# Patient Record
Sex: MALE | Race: WHITE
[De-identification: names, ages, dates, MRNs, and addresses within clinical notes are randomized per-mention and may not be internally consistent; named-entity substitution may affect disease eponyms.]

---

## 2017-07-19 NOTE — CODING QUERY MEDICAL NECESSITY
SUPPORTING DIAGNOSIS NEEDED





A supporting diagnosis is required for the test/procedure performed on this patient in 
order for us to be reimbursed by the patient's insurance. Please provide a supporting 
diagnosis for the following test/procedure listed below next to the test name along with 
your signature. 



*If there is no additional diagnosis for this patient that would support the following 
test/procedure please document that below next to the test/procedure.



Test(s)/Procedure(s) that require a supporting diagnosis:







* VITAMIN D, 25-HYDROXY                          DIAGNOSIS:







Provider Signature:  ______________________________  Date:  _______



Thank you  

Elizabeth Mckeon

[employer]

Phone:  [phone]

Fax:  [phone]



Once completed, please kindly fax back to [phone]



For questions please call [phone]

## 2018-04-20 ENCOUNTER — HOSPITAL ENCOUNTER (OUTPATIENT)
Dept: HOSPITAL 45 - C.PATHSPEC | Age: 73
Discharge: HOME | End: 2018-04-20
Attending: SURGERY
Payer: COMMERCIAL

## 2018-04-20 DIAGNOSIS — L82.1: Primary | ICD-10-CM

## 2018-04-25 ENCOUNTER — HOSPITAL ENCOUNTER (OUTPATIENT)
Dept: HOSPITAL 45 - C.ULTRBC | Age: 73
Discharge: HOME | End: 2018-04-25
Attending: SURGERY
Payer: COMMERCIAL

## 2018-04-25 DIAGNOSIS — R09.89: Primary | ICD-10-CM

## 2018-04-25 LAB
ALBUMIN SERPL-MCNC: 3.7 GM/DL (ref 3.4–5)
ALP SERPL-CCNC: 58 U/L (ref 45–117)
ALT SERPL-CCNC: 27 U/L (ref 12–78)
AST SERPL-CCNC: 19 U/L (ref 15–37)
BASOPHILS # BLD: 0.03 K/UL (ref 0–0.2)
BASOPHILS NFR BLD: 0.3 %
BUN SERPL-MCNC: 15 MG/DL (ref 7–18)
CALCIUM SERPL-MCNC: 9 MG/DL (ref 8.5–10.1)
CO2 SERPL-SCNC: 29 MMOL/L (ref 21–32)
CREAT SERPL-MCNC: 0.97 MG/DL (ref 0.6–1.4)
EOS ABS #: 0.28 K/UL (ref 0–0.5)
EOSINOPHIL NFR BLD AUTO: 163 K/UL (ref 130–400)
GLUCOSE SERPL-MCNC: 94 MG/DL (ref 70–99)
HCT VFR BLD CALC: 41.9 % (ref 42–52)
HGB BLD-MCNC: 14.7 G/DL (ref 14–18)
IG#: 0.02 K/UL (ref 0–0.02)
IMM GRANULOCYTES NFR BLD AUTO: 33.7 %
KETONES UR QL STRIP: 55 MG/DL
LYMPHOCYTES # BLD: 3.06 K/UL (ref 1.2–3.4)
MCH RBC QN AUTO: 30.4 PG (ref 25–34)
MCHC RBC AUTO-ENTMCNC: 35.1 G/DL (ref 32–36)
MCV RBC AUTO: 86.7 FL (ref 80–100)
MONO ABS #: 0.93 K/UL (ref 0.11–0.59)
MONOCYTES NFR BLD: 10.2 %
NEUT ABS #: 4.76 K/UL (ref 1.4–6.5)
NEUTROPHILS # BLD AUTO: 3.1 %
NEUTROPHILS NFR BLD AUTO: 52.5 %
PH UR: 129 MG/DL (ref 0–200)
PMV BLD AUTO: 8.6 FL (ref 7.4–10.4)
POTASSIUM SERPL-SCNC: 4.5 MMOL/L (ref 3.5–5.1)
PROT SERPL-MCNC: 7 GM/DL (ref 6.4–8.2)
RED CELL DISTRIBUTION WIDTH CV: 13.1 % (ref 11.5–14.5)
RED CELL DISTRIBUTION WIDTH SD: 41.9 FL (ref 36.4–46.3)
SODIUM SERPL-SCNC: 130 MMOL/L (ref 136–145)
WBC # BLD AUTO: 9.08 K/UL (ref 4.8–10.8)

## 2018-04-25 NOTE — DIAGNOSTIC IMAGING REPORT
CHEST 2 VIEWS ROUTINE



CLINICAL HISTORY: Z01.818 Preoperative [id number] preoperative evaluation



COMPARISON STUDY:  6/20/2016



FINDINGS: The bones soft tissues and hemidiaphragms are normal. The

cardiomediastinal silhouette is normal. The lungs are clear. The pulmonary

vasculature is normal. 



IMPRESSION:  Negative chest. 











The above report was generated using voice recognition software.  It may contain

grammatical, syntax or spelling errors.









Electronically signed by:  Hesham Bowman M.D.

4/25/2018 1:55 PM



Dictated Date/Time:  4/25/2018 1:55 PM

## 2018-04-25 NOTE — DIAGNOSTIC IMAGING REPORT
CAROTID DOPPLER NECK ART



HISTORY: Carotid bruit  R09.89 Carotid bruitPlease perform bilateral carotid

doppler to



COMPARISON: None.



TECHNIQUE: Real-time, grayscale, and color Doppler sonography of the carotid

arteries was performed. Imaging reviewed in the transverse and longitudinal

planes. All measurements were calculated based on NASCET criteria.



FINDINGS:

Antegrade flow is seen in the bilateral vertebral arteries. 

The brachial pressures are hemodynamically similar.

Mild plaque formation bilaterally



The peak systolic velocity within the right ICA is 79. The right systolic ratio

is 0.9.



The peak systolic velocity within the left ICA is 62. The left systolic ratio is

0.8.



IMPRESSION:  

No hemodynamically significant stenosis seen within the carotid arteries. Mild

plaque formation bilaterally









The above report was generated using voice recognition software.  It may contain

grammatical, syntax or spelling errors.







Electronically signed by:  Hesham Bowman M.D.

4/25/2018 2:13 PM



Dictated Date/Time:  4/25/2018 2:12 PM

## 2018-05-10 ENCOUNTER — HOSPITAL ENCOUNTER (OUTPATIENT)
Dept: HOSPITAL 45 - C.ACU | Age: 73
Discharge: HOME | End: 2018-05-10
Attending: SURGERY
Payer: COMMERCIAL

## 2018-05-10 VITALS
HEART RATE: 74 BPM | DIASTOLIC BLOOD PRESSURE: 68 MMHG | SYSTOLIC BLOOD PRESSURE: 146 MMHG | OXYGEN SATURATION: 96 % | TEMPERATURE: 97.16 F

## 2018-05-10 VITALS
TEMPERATURE: 97.7 F | HEART RATE: 69 BPM | OXYGEN SATURATION: 96 % | DIASTOLIC BLOOD PRESSURE: 76 MMHG | SYSTOLIC BLOOD PRESSURE: 141 MMHG

## 2018-05-10 VITALS
SYSTOLIC BLOOD PRESSURE: 142 MMHG | HEART RATE: 71 BPM | TEMPERATURE: 97.7 F | OXYGEN SATURATION: 94 % | DIASTOLIC BLOOD PRESSURE: 72 MMHG

## 2018-05-10 VITALS
HEIGHT: 72.01 IN | BODY MASS INDEX: 29.18 KG/M2 | WEIGHT: 215.46 LBS | HEIGHT: 72.01 IN | WEIGHT: 215.46 LBS | BODY MASS INDEX: 29.18 KG/M2 | BODY MASS INDEX: 29.18 KG/M2

## 2018-05-10 VITALS — OXYGEN SATURATION: 96 % | SYSTOLIC BLOOD PRESSURE: 144 MMHG | HEART RATE: 79 BPM | DIASTOLIC BLOOD PRESSURE: 80 MMHG

## 2018-05-10 DIAGNOSIS — K40.90: Primary | ICD-10-CM

## 2018-05-10 DIAGNOSIS — Z82.49: ICD-10-CM

## 2018-05-10 DIAGNOSIS — I10: ICD-10-CM

## 2018-05-10 DIAGNOSIS — E78.5: ICD-10-CM

## 2018-05-10 DIAGNOSIS — I73.9: ICD-10-CM

## 2018-05-10 DIAGNOSIS — I35.8: ICD-10-CM

## 2018-05-10 DIAGNOSIS — I25.10: ICD-10-CM

## 2018-05-10 DIAGNOSIS — Z82.3: ICD-10-CM

## 2018-05-10 DIAGNOSIS — Z79.82: ICD-10-CM

## 2018-05-10 DIAGNOSIS — Z88.8: ICD-10-CM

## 2018-05-10 DIAGNOSIS — R09.89: ICD-10-CM

## 2018-05-10 DIAGNOSIS — Z87.891: ICD-10-CM

## 2018-05-10 DIAGNOSIS — N40.0: ICD-10-CM

## 2018-05-10 NOTE — DISCHARGE INSTRUCTIONS
Discharge Instructions


Date of Service


May 10, 2018.





Admission


Reason for Admission:  Right Inguinal Hernia





Discharge


Discharge Diagnosis / Problem:  Right Inguinal Hernia





Discharge Goals


Goal(s):  Decrease discomfort, Improve function





Activity Recommendations


Activity Limitations:  as noted below


Lifting Limitations:  no more than 10 pounds


Exercise/Sports Limitations:  until after follow-up appointment


May Resume Sexual Activity:  after follow-up appointment


Shower/Bathe:  tomorrow


Driving or Machine Use:  resume 3 days after discharge





.





Instructions / Follow-Up


Instructions / Follow-Up


Please call the [facility] at [phone] to arrange for staple 

removal next week.  





Please follow-up with Dr. Guardado in the [facility] in 1-2 

weeks.  Please call the office at [phone] to make an appointment if you do 

not have one already. 





Please call the office with any questions or concerns.





Current Hospital Diet


Patient's current hospital diet:





Discharge Diet


Recommended Diet:  Regular Diet





Procedures


Procedures Performed:  


Open Right Inguinal Hernia Repair with Mesh MARLEX ON LAY





Pending Studies


Studies pending at discharge:  yes


List of pending studies:  


Pathology report.





Laboratory Results





Lipid Panel








Test


  4/25/18


13:21 Range/Units


 


 


Triglycerides Level 103  0-150  mg/dl


 


Cholesterol Level 129  0-200  mg/dl


 


HDL Cholesterol 53   mg/dl


 


Cholesterol/HDL Ratio 2.4   


 


LDL Cholesterol, Calculated 55   mg/dl











Medical Emergencies








.


Who to Call and When:





Medical Emergencies:  If at any time you feel your situation is an emergency, 

please call 911 immediately.





.





Non-Emergent Contact


Non-Emergency issues call your:  Primary Care Provider, Surgeon


Call Non-Emergent contact if:  temperature is above 101.5, your pain is not 

controlled, wound has increased drainage, wound has increased redness


.








"Provider Documentation" section prepared by Kait Jo.








.

## 2018-05-10 NOTE — HISTORY & PHYSICAL BRIDGE NOTE
H&P Re-Evaluation


Bridge Note:


I have examined the patient, reviewed the History & Physical and in the 

interval since the performance of the History & Physical I have noted the 

following changes of clinical significance: No changes noted wife at bedside, 

all questions answered pt marked told pt of normal car doppler report

## 2018-05-10 NOTE — OPERATIVE REPORT
DATE OF OPERATION:  05/10/2018

 

FIRST ASSISTANT:  Kait Jo PA-C.

 

PREOPERATIVE DIAGNOSIS:  Right recurrent hernia.

 

POSTOPERATIVE DIAGNOSES:  Right recurrent indirect hernia and direct defect.

 

PROCEDURE:  Open repair of the right indirect and direct hernia with Marlex

mesh tension free.

 

SUMMARY:  The patient was brought into the operating room theater and general

anesthesia, IV antibiotics were given.  The right lower quadrant was prepped

with Betadine scrubbing solution and properly draped.  We used preemptive

local analgesia 0.5% Marcaine to infiltrate 2 fingerbreadths medial anterior

superior iliac crest on the subfascial external oblique.  Once we infiltrated

the area, an incision was made parallel to the inguinal ligament, deepened

through subcutaneous tissue onto the external oblique.  Some subcutaneous

plexus was divided.  There was some scar tissue appreciated, scar tissue from

previous repair.  We started our dissection laterally onto the external

oblique, placed a finger on top of the external oblique, elevated the tissue.

 The external oblique fascia was completely intact.  We did not see any

sutures.  At this point, more local was used underneath the external oblique.

 Weitlaner was inserted.  An incision was made parallel to the ligament,

incised all the way to the external oblique on the external ring.  At this

point, we were able then to identify patient had a significant amount of

tissue coming through the external ring.  Once we freed that up, we were able

to dissect that out, elevated the cord and its structures over a Penrose

drain.  We at this point dissected down to the shelving portion of the

inguinal ligament above the conjoined tendon.  Again, we did not see any

suture material at least prominent.  At this point, we then were able to

identify a hernial sac that was pretty extensive going down the cord.  There

we saw a significant amount of scar tissue.  We took that down to free up the

indirect sac from the cord structures.  Along the cord structure, there was

1.5 cm sort of a cystic structure that appeared benign.  We excised that and

we sent it for permanent.  This hernial sac was taken all the way down to the

internal ring.  We opened it and freed up the cord structures from it.  Once

we opened the external ring, having identified that sort of cystic structure 
that

we took out of the cord, I was able to grab a piece of small bowel elevated

out and there was no evidence of any mucinous area in the abdomen.  This was

just a precaution, I do not think there was anything alarming about this

cystic structure that we took out.  We then ligated the base of the hernial

sac with 3-0 silk suture and resected the indirect sac, returned it

retroperitoneally.  We then closed the internal ring with some fascial

sutures of 3-0 silk.  We were able to then identify that the patient had also

a direct defect with a piece of fat coming through.  We did return that in

retroperitoneal area with 3 interrupted silk sutures.  We brought in a sheet

of Marlex mesh and onlaid it down to the conjoined tendon and shelving

portion of the ing ligament  lateral to the internal ring that could only 
accommodate

the tip of a hemostat.  Throughout this procedure, we did not see any nerve

structures at all, did not identify anything consistent with that.  The mesh

was onlaid, it was tension free.  More local was used above in the conjoined

tendon and on the wound itself.  We irrigated and closed the external oblique

fascia on top of the cord and the mesh with 3-0 silk sutures.  We were able

then to place a finger to reconstruct the external ring that could

accommodate the finger.  Subcutaneous tissue was brought back together with

2-0 Dexon interrupted and staples for skin edges.  Dressing was applied. 

Procedure was tolerated well by the patient.  Estimated blood loss

approximately 5 mL.  Patient was taken to recovery in good condition.

 

 

I attest to the content of the Intraoperative Record and any orders documented 
therein. Any exceptions are noted below.

 

 

 

MTDD

## 2018-05-10 NOTE — ANESTHESIOLOGY PROGRESS NOTE
Anesthesia Post Op Note


Date & Time


May 10, 2018 at 10:22





Vital Signs


Pain Intensity:  0





Vital Signs Past 12 Hours








  Date Time  Temp Pulse Resp B/P (MAP) Pulse Ox O2 Delivery O2 Flow Rate FiO2


 


5/10/18 10:15 36.2 78 20 153/84 96 Room Air  


 


5/10/18 10:05  77 18 142/72 95 Room Air  


 


5/10/18 09:55  67 16 157/80 100 Oxymask 10 


 


5/10/18 09:46 36.2 95 16 154/74 99 Oxymask 10 


 


5/10/18 06:40 36.5 69 18 141/76 (97) 96 Room Air  











Notes


Mental Status:  alert / awake / arousable, participated in evaluation


Pt Amnestic to Procedure:  Yes


Nausea / Vomiting:  adequately controlled


Pain:  adequately controlled


Airway Patency, RR, SpO2:  stable & adequate


BP & HR:  stable & adequate


Hydration State:  stable & adequate


Anesthetic Complications:  no major complications apparent

## 2018-05-10 NOTE — [facility] POST OPERATIVE BRIEF NOTE
Immediate Operative Summary


Operative Date


May 10, 2018.





Pre-Operative Diagnosis





Right inguinal hernia REC





Post-Operative Diagnosis





Right recurrent direct and indirect inguinal hernia





Procedure(s) Performed





Open Right Inguinal Hernia Repair with Mesh MARLEX ON LAY





Surgeon


Dr. Guardado





Assistant Surgeon(s)


LAURIE Jo PA-C





Estimated Blood Loss


5 cc





Findings


See Below


rec indirect hernia and direct





Specimens





A: cyst from cord structure





B: hernia sac





Anesthesia Type


General